# Patient Record
Sex: FEMALE
[De-identification: names, ages, dates, MRNs, and addresses within clinical notes are randomized per-mention and may not be internally consistent; named-entity substitution may affect disease eponyms.]

---

## 2022-11-04 PROBLEM — Z00.00 ENCOUNTER FOR PREVENTIVE HEALTH EXAMINATION: Status: ACTIVE | Noted: 2022-11-04

## 2022-12-22 ENCOUNTER — NON-APPOINTMENT (OUTPATIENT)
Age: 22
End: 2022-12-22

## 2022-12-22 ENCOUNTER — APPOINTMENT (OUTPATIENT)
Dept: OBGYN | Facility: CLINIC | Age: 22
End: 2022-12-22

## 2022-12-22 VITALS
HEIGHT: 61 IN | DIASTOLIC BLOOD PRESSURE: 60 MMHG | SYSTOLIC BLOOD PRESSURE: 100 MMHG | BODY MASS INDEX: 22.47 KG/M2 | WEIGHT: 119 LBS

## 2022-12-22 DIAGNOSIS — Z86.2 PERSONAL HISTORY OF DISEASES OF THE BLOOD AND BLOOD-FORMING ORGANS AND CERTAIN DISORDERS INVOLVING THE IMMUNE MECHANISM: ICD-10-CM

## 2022-12-22 DIAGNOSIS — R10.9 UNSPECIFIED ABDOMINAL PAIN: ICD-10-CM

## 2022-12-22 DIAGNOSIS — Z01.419 ENCOUNTER FOR GYNECOLOGICAL EXAMINATION (GENERAL) (ROUTINE) W/OUT ABNORMAL FINDINGS: ICD-10-CM

## 2022-12-22 DIAGNOSIS — Z87.42 PERSONAL HISTORY OF OTHER DISEASES OF THE FEMALE GENITAL TRACT: ICD-10-CM

## 2022-12-22 PROCEDURE — 99385 PREV VISIT NEW AGE 18-39: CPT

## 2022-12-23 PROBLEM — Z86.2 HISTORY OF FACTOR V LEIDEN MUTATION: Status: RESOLVED | Noted: 2022-12-23 | Resolved: 2022-12-23

## 2022-12-23 PROBLEM — Z87.42 HISTORY OF PCOS: Status: RESOLVED | Noted: 2022-12-22 | Resolved: 2022-12-23

## 2022-12-23 PROBLEM — Z01.419 ENCOUNTER FOR ANNUAL ROUTINE GYNECOLOGICAL EXAMINATION: Status: ACTIVE | Noted: 2022-12-23

## 2022-12-23 PROBLEM — R10.9 ABDOMINAL PAIN: Status: ACTIVE | Noted: 2022-12-23

## 2022-12-23 NOTE — HISTORY OF PRESENT ILLNESS
[FreeTextEntry1] : 23yo  LMP 11/15/22 here for initial visit to practice and Gyn exam. Pt has 6 year h/o abdominal pain. She had menarche at age 14 with fairly regular cycles until age 16 when she became ill with a GI illness that lasted for months. She then became amenorrheic. Periods after that were irregular. She was diagnosed with PCOS by Dr. Bragg and was managed with cyclic progesterone(10d course whenever >72 days without a period) as she has Factor V Leiden mutation and OCPs contraindicated. Pt is virginal.She has had extensive GI workup to assess for source of abdominal pain including colonoscopy and celiac testing. She has been told that this pain could be due to endometriosis. She does NOT have increase in her pain with menses and has not had dysmenorrhea. She had a transvaginal U/S in September(images reviewed today from disc patient brought with her) which showed multiple subcentimeter ovarian cysts as well as a hemorrhagic.corpus luteum. She also has autoimmune arthritis. She is followed by rheumatologist(at one time Lupus was considered)

## 2023-09-20 ENCOUNTER — APPOINTMENT (OUTPATIENT)
Dept: OBGYN | Facility: CLINIC | Age: 23
End: 2023-09-20

## 2024-01-03 ENCOUNTER — APPOINTMENT (OUTPATIENT)
Dept: OBGYN | Facility: CLINIC | Age: 24
End: 2024-01-03